# Patient Record
Sex: FEMALE | Race: WHITE | NOT HISPANIC OR LATINO | Employment: FULL TIME | URBAN - METROPOLITAN AREA
[De-identification: names, ages, dates, MRNs, and addresses within clinical notes are randomized per-mention and may not be internally consistent; named-entity substitution may affect disease eponyms.]

---

## 2020-10-19 ENCOUNTER — TRANSFERRED RECORDS (OUTPATIENT)
Dept: MULTI SPECIALTY CLINIC | Facility: CLINIC | Age: 40
End: 2020-10-19

## 2020-10-19 LAB — PAP SMEAR - HIM PATIENT REPORTED: NEGATIVE

## 2022-01-07 ENCOUNTER — NURSE TRIAGE (OUTPATIENT)
Dept: NURSING | Facility: CLINIC | Age: 42
End: 2022-01-07

## 2022-01-07 NOTE — TELEPHONE ENCOUNTER
Diagnosed with covid/ 12/31/21/ symptoms of nasal congestion and non productive cough as slightly better but still present/ nasal discharge contains some blood/ given homecare and symptoms to watch for/no fever and has never had one   aRmón Douglas RN [phone]    Reason for Disposition    [1] COVID-19 diagnosed by positive lab test AND [2] mild symptoms (e.g., cough, fever, others) AND [3] no complications or SOB    Additional Information    Negative: SEVERE difficulty breathing (e.g., struggling for each breath, speaks in single words)    Negative: Difficult to awaken or acting confused (e.g., disoriented, slurred speech)    Negative: Bluish (or gray) lips or face now    Negative: Shock suspected (e.g., cold/pale/clammy skin, too weak to stand, low BP, rapid pulse)    Negative: Sounds like a life-threatening emergency to the triager    Negative: [1] COVID-19 exposure AND [2] no symptoms    Negative: COVID-19 vaccine reaction suspected (e.g., fever, headache, muscle aches) occurring 1 to 3 days after getting vaccine    Negative: COVID-19 vaccine, questions about    Negative: [1] Lives with someone known to have influenza (flu test positive) AND [2] flu-like symptoms (e.g., cough, runny nose, sore throat, SOB; with or without fever)    Negative: [1] Adult with possible COVID-19 symptoms AND [2] triager concerned about severity of symptoms or other causes    Negative: COVID-19 and breastfeeding, questions about    Negative: SEVERE or constant chest pain or pressure (Exception: mild central chest pain, present only when coughing)    Negative: MODERATE difficulty breathing (e.g., speaks in phrases, SOB even at rest, pulse 100-120)    Negative: [1] Headache AND [2] stiff neck (can't touch chin to chest)    Negative: MILD difficulty breathing (e.g., minimal/no SOB at rest, SOB with walking, pulse <100)    Negative: Chest pain or pressure    Negative: Patient sounds very sick or weak to the triager    Negative:  Fever > 103 F (39.4 C)    Negative: [1] Fever > 101 F (38.3 C) AND [2] age > 60 years    Negative: [1] Fever > 100.0 F (37.8 C) AND [2] bedridden (e.g., nursing home patient, CVA, chronic illness, recovering from surgery)    Negative: HIGH RISK for severe COVID complications (e.g., age > 64 years, obesity with BMI > 25, pregnant, chronic lung disease or other chronic medical condition)  (Exception: Already seen by PCP and no new or worsening symptoms.)    Negative: [1] HIGH RISK patient AND [2] influenza is widespread in the community AND [3] ONE OR MORE respiratory symptoms: cough, sore throat, runny or stuffy nose    Negative: [1] HIGH RISK patient AND [2] influenza exposure within the last 7 days AND [3] ONE OR MORE respiratory symptoms: cough, sore throat, runny or stuffy nose    Negative: [1] COVID-19 infection suspected by caller or triager AND [2] mild symptoms (cough, fever, or others) AND [3] negative COVID-19 rapid test    Negative: Fever present > 3 days (72 hours)    Negative: [1] Fever returns after gone for over 24 hours AND [2] symptoms worse or not improved    Negative: [1] Continuous (nonstop) coughing interferes with work or school AND [2] no improvement using cough treatment per protocol    Negative: Cough present > 3 weeks    Negative: [1] COVID-19 diagnosed by positive lab test AND [2] NO symptoms (e.g., cough, fever, others)    Protocols used: CORONAVIRUS (COVID-19) DIAGNOSED OR [id number] 8.25.2021

## 2022-01-30 ENCOUNTER — HEALTH MAINTENANCE LETTER (OUTPATIENT)
Age: 42
End: 2022-01-30

## 2022-06-23 ENCOUNTER — OFFICE VISIT (OUTPATIENT)
Dept: FAMILY MEDICINE | Facility: CLINIC | Age: 42
End: 2022-06-23
Payer: COMMERCIAL

## 2022-06-23 VITALS
RESPIRATION RATE: 16 BRPM | DIASTOLIC BLOOD PRESSURE: 75 MMHG | OXYGEN SATURATION: 99 % | TEMPERATURE: 97.3 F | BODY MASS INDEX: 21.07 KG/M2 | WEIGHT: 118.9 LBS | HEIGHT: 63 IN | SYSTOLIC BLOOD PRESSURE: 107 MMHG | HEART RATE: 73 BPM

## 2022-06-23 DIAGNOSIS — F41.9 ANXIETY: ICD-10-CM

## 2022-06-23 DIAGNOSIS — M21.611 BUNION OF RIGHT FOOT: Primary | ICD-10-CM

## 2022-06-23 DIAGNOSIS — Z01.818 PREOP GENERAL PHYSICAL EXAM: ICD-10-CM

## 2022-06-23 LAB — HGB BLD-MCNC: 13.5 G/DL (ref 11.7–15.7)

## 2022-06-23 PROCEDURE — 99214 OFFICE O/P EST MOD 30 MIN: CPT | Performed by: PHYSICIAN ASSISTANT

## 2022-06-23 PROCEDURE — 85018 HEMOGLOBIN: CPT | Performed by: PHYSICIAN ASSISTANT

## 2022-06-23 PROCEDURE — 36415 COLL VENOUS BLD VENIPUNCTURE: CPT | Performed by: PHYSICIAN ASSISTANT

## 2022-06-23 RX ORDER — INDOMETHACIN 75 MG/1
75 CAPSULE, EXTENDED RELEASE ORAL 2 TIMES DAILY WITH MEALS
COMMUNITY
End: 2022-06-23

## 2022-06-23 RX ORDER — SERTRALINE HYDROCHLORIDE 25 MG/1
25 TABLET, FILM COATED ORAL DAILY
COMMUNITY
End: 2022-06-23

## 2022-06-23 RX ORDER — LACTOBACILLUS RHAMNOSUS GG 10B CELL
1 CAPSULE ORAL 2 TIMES DAILY
COMMUNITY
End: 2023-01-19

## 2022-06-23 ASSESSMENT — PAIN SCALES - GENERAL: PAINLEVEL: MILD PAIN (3)

## 2022-06-23 NOTE — PROGRESS NOTES
[address], [address]  [facility] [address]  Phone: [phone]  Primary Provider: No Ref-Primary, Physician  Pre-op Performing Provider: ELIEZER WHITLEY PA-C      PREOPERATIVE EVALUATION:  Today's date: 6/23/2022    Yvonne Mercado is a 41 year old female who presents for a preoperative evaluation.    Surgical Information:  Surgery/Procedure: right foot bunionectomy  Surgery Location: [facility]  Surgeon: Dr. Sanford  Surgery Date: 7/11/22  Time of Surgery: TBD  Where patient plans to recover: At home with family  Fax number for surgical facility: [phone]    Type of Anesthesia Anticipated: to be determined    Assessment & Plan     The proposed surgical procedure is considered LOW risk.    Bunion of right foot  Pt to stop nsaids one week prior to surgery    Preop general physical exam    - Hemoglobin    Anxiety  - stable  - sertraline (ZOLOFT) 50 MG tablet; Take 1 tablet (50 mg) by mouth daily refilled         Risks and Recommendations:  The patient has the following additional risks and recommendations for perioperative complications:   - No identified additional risk factors other than previously addressed    Medication Instructions:  Patient is to take all scheduled medications on the day of surgery    RECOMMENDATION:  APPROVAL GIVEN to proceed with proposed procedure, without further diagnostic evaluation.      Subjective     HPI related to upcoming procedure: bunion    Preop Questions 6/22/2022   1. Have you ever had a heart attack or stroke? No   2. Have you ever had surgery on your heart or blood vessels, such as a stent placement, a coronary artery bypass, or surgery on an artery in your head, neck, heart, or legs? No   3. Do you have chest pain with activity? No   4. Do you have a history of  heart failure? No   5. Do you currently have a cold, bronchitis or symptoms of other infection? No   6. Do you have a cough, shortness of breath, or wheezing? No   7. Do  you or anyone in your family have previous history of blood clots? No   8. Do you or does anyone in your family have a serious bleeding problem such as prolonged bleeding following surgeries or cuts? No   9. Have you ever had problems with anemia or been told to take iron pills? No   10. Have you had any abnormal blood loss such as black, tarry or bloody stools, or abnormal vaginal bleeding? No   11. Have you ever had a blood transfusion? No   12. Are you willing to have a blood transfusion if it is medically needed before, during, or after your surgery? Yes   13. Have you or any of your relatives ever had problems with anesthesia? No   14. Do you have sleep apnea, excessive snoring or daytime drowsiness? No   15. Do you have any artifical heart valves or other implanted medical devices like a pacemaker, defibrillator, or continuous glucose monitor? No   16. Do you have artificial joints? No   17. Are you allergic to latex? No   18. Is there any chance that you may be pregnant? No         Review of Systems  CONSTITUTIONAL: NEGATIVE for fever, chills, change in weight  ENT/MOUTH: NEGATIVE for ear, mouth and throat problems  RESP: NEGATIVE for significant cough or SOB  CV: NEGATIVE for chest pain, palpitations or peripheral edema    There are no problems to display for this patient.     Past Medical History:   Diagnosis Date     Anxiety      Bunion, right foot      Past Surgical History:   Procedure Laterality Date     DILATION AND CURETTAGE  2014     LASIK  2021     Current Outpatient Medications   Medication Sig Dispense Refill     lactobacillus rhamnosus, GG, (CULTURELL) capsule Take 1 capsule by mouth 2 times daily       sertraline (ZOLOFT) 50 MG tablet Take 1 tablet (50 mg) by mouth daily 90 tablet 1       No Known Allergies     Social History     Tobacco Use     Smoking status: Former Smoker     Smokeless tobacco: Never Used   Substance Use Topics     Alcohol use: Not on file     Family History   Problem  "Relation Age of Onset     Depression Father      Osteoporosis Maternal Grandmother      Depression Paternal Grandmother      Alcoholism Paternal Grandmother      History   Drug Use Not on file         Objective     /75 (BP Location: Left arm, Patient Position: Sitting, Cuff Size: Adult Regular)   Pulse 73   Temp 97.3  F (36.3  C) (Temporal)   Resp 16   Ht 1.588 m (5' 2.5\")   Wt 53.9 kg (118 lb 14.4 oz)   SpO2 99%   BMI 21.40 kg/m      Physical Exam  GENERAL APPEARANCE: healthy, alert and no distress  HENT: ear canals and TM's normal and nose and mouth without ulcers or lesions  RESP: lungs clear to auscultation - no rales, rhonchi or wheezes  CV: regular rate and rhythm, normal S1 S2, no S3 or S4 and no murmur, click or rub   ABDOMEN: soft, nontender, no HSM or masses and bowel sounds normal  NEURO: Normal strength and tone, sensory exam grossly normal, mentation intact and speech normal  MSK: R foot has bunion deformity. Pt using cane for ambulation due to pain from bunion    Diagnostics:     Results for orders placed or performed in visit on 06/23/22   Hemoglobin     Status: Normal   Result Value Ref Range    Hemoglobin 13.5 11.7 - 15.7 g/dL         Signed Electronically by: Indy Pederson PA-C  Copy of this evaluation report is provided to requesting physician.      "

## 2022-10-15 ENCOUNTER — HEALTH MAINTENANCE LETTER (OUTPATIENT)
Age: 42
End: 2022-10-15

## 2023-01-18 ENCOUNTER — E-VISIT (OUTPATIENT)
Dept: URGENT CARE | Facility: CLINIC | Age: 43
End: 2023-01-18
Payer: COMMERCIAL

## 2023-01-18 DIAGNOSIS — R21 RASH: Primary | ICD-10-CM

## 2023-01-18 PROCEDURE — 99207 PR NON-BILLABLE SERV PER CHARTING: CPT | Performed by: NURSE PRACTITIONER

## 2023-01-19 ENCOUNTER — OFFICE VISIT (OUTPATIENT)
Dept: FAMILY MEDICINE | Facility: CLINIC | Age: 43
End: 2023-01-19
Payer: COMMERCIAL

## 2023-01-19 VITALS
BODY MASS INDEX: 21.62 KG/M2 | WEIGHT: 122 LBS | SYSTOLIC BLOOD PRESSURE: 111 MMHG | RESPIRATION RATE: 18 BRPM | DIASTOLIC BLOOD PRESSURE: 66 MMHG | HEIGHT: 63 IN | OXYGEN SATURATION: 100 % | HEART RATE: 79 BPM | TEMPERATURE: 96.9 F

## 2023-01-19 DIAGNOSIS — L73.8 HOT TUB FOLLICULITIS: Primary | ICD-10-CM

## 2023-01-19 PROCEDURE — 99213 OFFICE O/P EST LOW 20 MIN: CPT | Performed by: PHYSICIAN ASSISTANT

## 2023-01-19 RX ORDER — CIPROFLOXACIN 500 MG/1
500 TABLET, FILM COATED ORAL 2 TIMES DAILY
Qty: 10 TABLET | Refills: 0 | Status: SHIPPED | OUTPATIENT
Start: 2023-01-19

## 2023-01-19 ASSESSMENT — PAIN SCALES - GENERAL: PAINLEVEL: NO PAIN (0)

## 2023-01-19 NOTE — PATIENT INSTRUCTIONS
Dear Yvonne Mercado,    We are sorry you are not feeling well. Based on the responses you provided, it is recommended that you be seen in-person in urgent care so we can better evaluate your symptoms. Please click here to find the nearest urgent care location to you.   You will not be charged for this Visit. Thank you for trusting us with your care.    Karley Quach, CNP

## 2023-01-19 NOTE — Clinical Note
Please abstract the following data from this visit with this patient into the appropriate field in Epic:  Tests that can be patient reported without a hard copy:  Pap smear done on this date: 10/19/2020 (approximately), by this group: [facility], results were Normal.

## 2023-01-19 NOTE — PROGRESS NOTES
"      Lisa Russell is a 42 year old presenting for the following health issues:  Derm Problem (Rash for 2 days) and Health Maintenance (Last Pap Smear done on 10/19/2020 at [facility] [place], abstracted)      Pt has rash for 2 days  She was at her cabin where there is a hot tub 2 days prior to onset of rash  Rash is pruritic  No sore throat or cold sxs  She did start an herbal supplement for menopause but not sure what is in this  Otherwise feels well.    History of Present Illness       Reason for visit:  An itchy rash on my torso  Symptom onset:  1-3 days ago  Symptoms include:  Torso  Symptom intensity:  Moderate  Symptom progression:  Staying the same  Had these symptoms before:  No  What makes it worse:  No  What makes it better:  No    She eats 2-3 servings of fruits and vegetables daily.She consumes 0 sweetened beverage(s) daily.She exercises with enough effort to increase her heart rate 30 to 60 minutes per day.  She exercises with enough effort to increase her heart rate 5 days per week.   She is taking medications regularly.       Past Medical History:   Diagnosis Date     Anxiety      Bunion, right foot      Past Surgical History:   Procedure Laterality Date     DILATION AND CURETTAGE  2014     LASIK  2021     Social History     Tobacco Use     Smoking status: Former     Smokeless tobacco: Never   Substance Use Topics     Alcohol use: Yes     Comment: 7 drinks a week     Current Outpatient Medications   Medication Sig Dispense Refill     ciprofloxacin (CIPRO) 500 MG tablet Take 1 tablet (500 mg) by mouth 2 times daily 10 tablet 0     sertraline (ZOLOFT) 50 MG tablet TAKE 1 TABLET(50 MG) BY MOUTH DAILY 90 tablet 0     Allergies   Allergen Reactions     No Known Allergies        PHYSICAL EXAM:    /66 (BP Location: Right arm, Patient Position: Sitting, Cuff Size: Adult Regular)   Pulse 79   Temp 96.9  F (36.1  C) (Temporal)   Resp 18   Ht 1.588 m (5' 2.5\")   Wt 55.3 kg (122 lb)   " SpO2 100%   BMI 21.96 kg/m      Patient appears non toxic  Diffuse papular rash on buttocks, abd and few other scattered papules on back    Assessment and Plan:     (L73.8) Hot tub folliculitis  (primary encounter diagnosis)  Comment: call the crew that manages the hot tub as suspect pseudomonoas infection  Plan: ciprofloxacin (CIPRO) 500 MG tablet        Bid x 5d, hydrocortisone cream bid for the itch and okay to take benadryl at night if itchy.      Indy Pederson PA-C

## 2023-03-26 ENCOUNTER — HEALTH MAINTENANCE LETTER (OUTPATIENT)
Age: 43
End: 2023-03-26

## 2023-07-06 DIAGNOSIS — F41.9 ANXIETY: ICD-10-CM

## 2023-08-07 ENCOUNTER — OFFICE VISIT (OUTPATIENT)
Dept: URGENT CARE | Facility: URGENT CARE | Age: 43
End: 2023-08-07
Payer: COMMERCIAL

## 2023-08-07 VITALS
HEART RATE: 85 BPM | RESPIRATION RATE: 15 BRPM | BODY MASS INDEX: 22.32 KG/M2 | SYSTOLIC BLOOD PRESSURE: 109 MMHG | TEMPERATURE: 100.1 F | OXYGEN SATURATION: 96 % | DIASTOLIC BLOOD PRESSURE: 72 MMHG | WEIGHT: 124 LBS
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
## 2023-08-07 PROCEDURE — 99213 OFFICE O/P EST LOW 20 MIN: CPT | Mod: 25 | Performed by: PHYSICIAN ASSISTANT

## 2023-08-07 PROCEDURE — 90715 TDAP VACCINE 7 YRS/> IM: CPT | Performed by: PHYSICIAN ASSISTANT

## 2023-08-07 PROCEDURE — 90471 IMMUNIZATION ADMIN: CPT | Performed by: PHYSICIAN ASSISTANT

## 2023-08-07 NOTE — PATIENT INSTRUCTIONS
Keep clean and dry  Keep splint in position  Change bandage every 24 hours  Follow up with hand specialist

## 2023-08-07 NOTE — PROGRESS NOTES
Assessment & Plan     Dog bite, initial encounter  - amoxicillin-clavulanate (AUGMENTIN) 875-125 MG tablet; Take 1 tablet by mouth 2 times daily for 10 days  - Orthopedic  Referral; Future  - TDAP 10-64Y (ADACEL,BOOSTRIX)    Bite was about 22 hours ago. I used steri strips to close due on infection risk and will start on Augmentin twice daily for 10 days. Keep splint in place, change bandage every 24 hours. Follow up with hand specialist for further evaluation. Tetanus updated today.    Return in about 5 days (around 8/12/2023) for consult with hand specialist.     Krista Lanier PA-C  [facility]    Subjective   Yvonne Mercado is a 42 year old who presents for the following health issues     Patient presents with:  Dog Bite    HPI    Laceration    Mechanism of injury: dog bite  History provided by: Patient  Time of injury was 1 day ago- about 22 hrs ago.    This is a non-work related injury.    Associated symptoms: Denies numbness, weakness, or loss of function    Last tetanus booster within 10 years: No    LACERATION EXAM:   Size of laceration: 3 centimeters  Characteristics of the laceration: clean, jagged, and swollen  Depth of laceration: deep  Tendon function intact: Yes  Sensation to light touch intact: Yes  Pulses/capillary refill intact: Yes  Foreign body: No    Picture included in patient's chart: yes    PROCEDURE NOTE:  Anesthesia: None  Wound was explored for any foreign bodies and evaluated for tendon, nerve, vessel or joint involvement.    Closure was simple  Laceration was closed with Steri-strips  Bandage was applied  Patient tolerated the procedure well        Review of Systems   ROS negative except as stated above.      Objective    /72   Pulse 85   Temp 100.1  F (37.8  C)   Resp 15   Wt 56.2 kg (124 lb)   SpO2 96%   BMI 22.32 kg/m    Physical Exam   GENERAL: healthy, alert and no distress  SKIN: laceration to left volar pinky as shown in  photo above    No results found for any visits on 08/07/23.

## 2023-08-08 ENCOUNTER — OFFICE VISIT (OUTPATIENT)
Dept: ORTHOPEDICS | Facility: CLINIC | Age: 43
End: 2023-08-08
Attending: PHYSICIAN ASSISTANT
Payer: COMMERCIAL

## 2023-08-08 ENCOUNTER — PRE VISIT (OUTPATIENT)
Dept: ORTHOPEDICS | Facility: CLINIC | Age: 43
End: 2023-08-08

## 2023-08-08 ENCOUNTER — ANCILLARY PROCEDURE (OUTPATIENT)
Dept: GENERAL RADIOLOGY | Facility: CLINIC | Age: 43
End: 2023-08-08
Attending: STUDENT IN AN ORGANIZED HEALTH CARE EDUCATION/TRAINING PROGRAM
Payer: COMMERCIAL

## 2023-08-08 DIAGNOSIS — M79.646 PAIN IN FINGER: ICD-10-CM
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
## 2023-08-08 DIAGNOSIS — M79.646 PAIN IN FINGER: Primary | ICD-10-CM

## 2023-08-08 PROCEDURE — 99203 OFFICE O/P NEW LOW 30 MIN: CPT | Performed by: STUDENT IN AN ORGANIZED HEALTH CARE EDUCATION/TRAINING PROGRAM

## 2023-08-08 PROCEDURE — 73140 X-RAY EXAM OF FINGER(S): CPT | Mod: LT | Performed by: RADIOLOGY

## 2023-08-08 ASSESSMENT — ENCOUNTER SYMPTOMS
SKIN CHANGES: 0
POOR WOUND HEALING: 0
NAIL CHANGES: 0

## 2023-08-08 NOTE — PATIENT INSTRUCTIONS
Twice a day, soak hand in warm water with a little bit of antibacterial soap. After twenty minutes of soaking, rinse off hand, pat dry, and apply dressing. Apply antibiotic gauze (Xeroform) first, followed by normal gauze and a wrap or band-aid over top.

## 2023-08-08 NOTE — PROGRESS NOTES
"Chief Complaint:   Chief Complaint   Patient presents with    Consult     Consult for dog bite laceration to left small finger DOI 8/7/23       Referring Physician: Krista Lanier    Date of Injury: 8/6/23  Mechanism of Injury: Dog Bite  Diagnosis: Laceration left small finger  Treatment: PO antibiotics    History of Present Illness: Yvonne Mercado is a 42 year old RHD female presenting for evaluation of dog bite to her left small finger. She was bit by a foster dog at her neighbors house 2 days ago and was seen at urgent care yesterday for evaluation. X-rays at that time were negative. She does not have any sensory changes and is able to move all of the joints in her finger however flexion ROM is limited due to pain/edema. Bleeding has been well controlled however she states that the wound has been \"oozing\". The patient was given a tetanus shot and started on Augmentin at the urgent care yesterday. The wound was then closed with steri strips and the patient was referred here for further evaluation/treatment. No fever/chills.    Clinical documentation by Krista Lanier PA-C on 8/7/2023 was reviewed.      Past Medical History:   Past Medical History:   Diagnosis Date    Anxiety     Bunion, right foot        Past Surgical History:   Past Surgical History:   Procedure Laterality Date    DILATION AND CURETTAGE  2014    LASIK  2021       Medications:   Current Outpatient Medications:     amoxicillin-clavulanate (AUGMENTIN) 875-125 MG tablet, Take 1 tablet by mouth 2 times daily for 10 days, Disp: 20 tablet, Rfl: 0    ciprofloxacin (CIPRO) 500 MG tablet, Take 1 tablet (500 mg) by mouth 2 times daily, Disp: 10 tablet, Rfl: 0    sertraline (ZOLOFT) 50 MG tablet, TAKE 1 TABLET(50 MG) BY MOUTH DAILY, Disp: 90 tablet, Rfl: 0    Allergy:   Allergies   Allergen Reactions    No Known Allergies        Social History:   History   Smoking Status    Former   Smokeless Tobacco    Never      Social History     Tobacco Use "    Smoking status: Former    Smokeless tobacco: Never   Vaping Use    Vaping Use: Never used   Substance Use Topics    Alcohol use: Yes     Comment: 7 drinks a week    Drug use: Never        Family History:   Family History   Problem Relation Age of Onset    Depression Father     Osteoporosis Maternal Grandmother     Depression Paternal Grandmother     Alcoholism Paternal Grandmother        Physical Examination:  There were no vitals filed for this visit.  There is no height or weight on file to calculate BMI.    Well appearing, well nourished  Alert and oriented x 3, cooperative with exam     Left small finger  2cm open laceration overlying the volar aspect of the proximal phalanx  TTP around the laceration with associated edema. No significant erythema or purulent discharge.  Range of Motion:   Digit:  Full flexion/extension of DIP. Minimally decreased flexion of PIP secondary to pain/edema with full extension . +FDS/FDP  Motor Exam: Intact TU/OK/x2-3  Sensory Exam: Sensation intact to the distal finger. 2 point discrimination 5 mm UDN and RDN small finger  Vascular Exam:  Normal color and warmth to the distal finger    Imaging/Studies:  XR (3 views) of the left small finger was obtained 8/8/2023.  I reviewed the images with the patient.  The imaging study shows no fracture/dislocation.  Well maintained joint space.    Assessment: Yvonne Mercado is a 42 year old female presenting s/p dog bite to the left small finger, in stable condition    Plan:   I had a discussion with the patient regarding my clinical findings, diagnosis, and treatment plan. Her wound does not currently look infected and she is neurovascularly intact with intact tendon function. Continue PO antibiotics and local wound care.  All questions answered.     Local wound care with daily soaks and Xeroform to wound  - Continue PO antibiotics    Next Visit:   Follow-up: 1 week(s).   Imaging: None..   Plan: wound check      VENUS VAKHSHORI,  MD  Answers submitted by the patient for this visit:  Symptoms you have experienced in the last 30 days (Submitted on 8/8/2023)  General Symptoms: No  Skin Symptoms: Yes  HENT Symptoms: No  EYE SYMPTOMS: No  HEART SYMPTOMS: No  LUNG SYMPTOMS: No  INTESTINAL SYMPTOMS: No  URINARY SYMPTOMS: No  GYNECOLOGIC SYMPTOMS: No  BREAST SYMPTOMS: No  SKELETAL SYMPTOMS: No  BLOOD SYMPTOMS: No  NERVOUS SYSTEM SYMPTOMS: No  MENTAL HEALTH SYMPTOMS: No   (Submitted on 8/8/2023)  Changes in hair: No  Changes in moles/birth marks: No  Itching: No  Rashes: No  Changes in nails: No  Acne: No  Hair in places you don't want it: No  Change in facial hair: No  Warts: No  Non-healing sores: No  Scarring: No  Flaking of skin: No  Color changes of hands/feet in cold : No  Sun sensitivity: No  Skin thickening: No

## 2023-08-08 NOTE — NURSING NOTE
Reason For Visit:   Chief Complaint   Patient presents with    Consult     Consult for dog bite laceration to left small finger DOI 8/7/23       Primary MD: No Ref-Primary, Physician  Ref. MD: Krista Lanier PA-C    Age: 42 year old    ?  No      There were no vitals taken for this visit.      Pain Assessment  Patient Currently in Pain: Yes  0-10 Pain Scale: 3  Primary Pain Location: Finger (Comment which one) (left small)  Pain Descriptors: Burning, Intermittent  Alleviating Factors: NSAIDS, Ice    Hand Dominance Evaluation  Hand Dominance: Right          QuickDASH Assessment      8/8/2023     9:24 AM   QuickDASH Main   1. Open a tight or new jar Moderate difficulty   2. Do heavy household chores (e.g., wash walls, floors) Moderate difficulty   3. Carry a shopping bag or briefcase Mild difficulty   4. Wash your back Moderate difficulty   5. Use a knife to cut food Mild difficulty   6. Recreational activities in which you take some force or impact through your arm, shoulder or hand (e.g., golf, hammering, tennis, etc.) Moderate difficulty   7. During the past week, to what extent has your arm, shoulder or hand problem interfered with your normal social activities with family, friends, neighbours or groups Slightly   8. During the past week, were you limited in your work or other regular daily activities as a result of your arm, shoulder or hand problem Slightly limited   9. Arm, shoulder or hand pain Moderate   10.Tingling (pins and needles) in your arm,shoulder or hand Mild   11. During the past week, how much difficulty have you had sleeping because of the pain in your arm, shoulder or hand Mild difficulty   Quickdash Ability Score 36.36          Current Outpatient Medications   Medication Sig Dispense Refill    amoxicillin-clavulanate (AUGMENTIN) 875-125 MG tablet Take 1 tablet by mouth 2 times daily for 10 days 20 tablet 0    ciprofloxacin (CIPRO) 500 MG tablet Take 1 tablet (500 mg) by mouth 2 times  daily 10 tablet 0    sertraline (ZOLOFT) 50 MG tablet TAKE 1 TABLET(50 MG) BY MOUTH DAILY 90 tablet 0       Allergies   Allergen Reactions    No Known Allergies        Lori Mcmahan, ATC

## 2023-08-08 NOTE — LETTER
"    8/8/2023         RE: Yvonne Mercado  [address] [facility] [address]        Dear Colleague,    Thank you for referring your patient, Yvonne Mercado, to the [facility] [place]. Please see a copy of my visit note below.    Chief Complaint:   Chief Complaint   Patient presents with    Consult     Consult for dog bite laceration to left small finger DOI 8/7/23       Referring Physician: Krista Lanier    Date of Injury: 8/6/23  Mechanism of Injury: Dog Bite  Diagnosis: Laceration left small finger  Treatment: PO antibiotics    History of Present Illness: Yvonne Mercado is a 42 year old RHD female presenting for evaluation of dog bite to her left small finger. She was bit by a foster dog at her neighbors house 2 days ago and was seen at urgent care yesterday for evaluation. X-rays at that time were negative. She does not have any sensory changes and is able to move all of the joints in her finger however flexion ROM is limited due to pain/edema. Bleeding has been well controlled however she states that the wound has been \"oozing\". The patient was given a tetanus shot and started on Augmentin at the urgent care yesterday. The wound was then closed with steri strips and the patient was referred here for further evaluation/treatment. No fever/chills.    Clinical documentation by Krista Lanier PA-C on 8/7/2023 was reviewed.      Past Medical History:   Past Medical History:   Diagnosis Date    Anxiety     Bunion, right foot        Past Surgical History:   Past Surgical History:   Procedure Laterality Date    DILATION AND CURETTAGE  2014    LASIK  2021       Medications:   Current Outpatient Medications:     amoxicillin-clavulanate (AUGMENTIN) 875-125 MG tablet, Take 1 tablet by mouth 2 times daily for 10 days, Disp: 20 tablet, Rfl: 0    ciprofloxacin (CIPRO) 500 MG tablet, Take 1 tablet (500 mg) by mouth 2 times daily, Disp: 10 tablet, Rfl: 0    sertraline " (ZOLOFT) 50 MG tablet, TAKE 1 TABLET(50 MG) BY MOUTH DAILY, Disp: 90 tablet, Rfl: 0    Allergy:   Allergies   Allergen Reactions    No Known Allergies        Social History:   History   Smoking Status    Former   Smokeless Tobacco    Never      Social History     Tobacco Use    Smoking status: Former    Smokeless tobacco: Never   Vaping Use    Vaping Use: Never used   Substance Use Topics    Alcohol use: Yes     Comment: 7 drinks a week    Drug use: Never        Family History:   Family History   Problem Relation Age of Onset    Depression Father     Osteoporosis Maternal Grandmother     Depression Paternal Grandmother     Alcoholism Paternal Grandmother        Physical Examination:  There were no vitals filed for this visit.  There is no height or weight on file to calculate BMI.    Well appearing, well nourished  Alert and oriented x 3, cooperative with exam     Left small finger  2cm open laceration overlying the volar aspect of the proximal phalanx  TTP around the laceration with associated edema. No significant erythema or purulent discharge.  Range of Motion:   Digit:  Full flexion/extension of DIP. Minimally decreased flexion of PIP secondary to pain/edema with full extension . +FDS/FDP  Motor Exam: Intact TU/OK/x2-3  Sensory Exam: Sensation intact to the distal finger. 2 point discrimination 5 mm UDN and RDN small finger  Vascular Exam:  Normal color and warmth to the distal finger    Imaging/Studies:  XR (3 views) of the left small finger was obtained 8/8/2023.  I reviewed the images with the patient.  The imaging study shows no fracture/dislocation.  Well maintained joint space.    Assessment: Yvonne Mercado is a 42 year old female presenting s/p dog bite to the left small finger, in stable condition    Plan:   I had a discussion with the patient regarding my clinical findings, diagnosis, and treatment plan. Her wound does not currently look infected and she is neurovascularly intact with intact  tendon function. Continue PO antibiotics and local wound care.  All questions answered.     Local wound care with daily soaks and Xeroform to wound  - Continue PO antibiotics    Next Visit:   Follow-up: 1 week(s).   Imaging: None..   Plan: wound check      IAIN MD JONEL  Answers submitted by the patient for this visit:  Symptoms you have experienced in the last 30 days (Submitted on 8/8/2023)  General Symptoms: No  Skin Symptoms: Yes  HENT Symptoms: No  EYE SYMPTOMS: No  HEART SYMPTOMS: No  LUNG SYMPTOMS: No  INTESTINAL SYMPTOMS: No  URINARY SYMPTOMS: No  GYNECOLOGIC SYMPTOMS: No  BREAST SYMPTOMS: No  SKELETAL SYMPTOMS: No  BLOOD SYMPTOMS: No  NERVOUS SYSTEM SYMPTOMS: No  MENTAL HEALTH SYMPTOMS: No   (Submitted on 8/8/2023)  Changes in hair: No  Changes in moles/birth marks: No  Itching: No  Rashes: No  Changes in nails: No  Acne: No  Hair in places you don't want it: No  Change in facial hair: No  Warts: No  Non-healing sores: No  Scarring: No  Flaking of skin: No  Color changes of hands/feet in cold : No  Sun sensitivity: No  Skin thickening: No

## 2023-08-08 NOTE — TELEPHONE ENCOUNTER
DIAGNOSIS: dog bite, deep laceration on volar pinky, sensation and tendon fxn intact / Krista Lanier PA-C in [facility] / p1    APPOINTMENT DATE: 8/8/23   NOTES STATUS DETAILS   OFFICE NOTE from referring provider Internal 8/7/23 OV Krista Lanier PA-C    MEDICATION LIST Internal

## 2023-08-15 ENCOUNTER — OFFICE VISIT (OUTPATIENT)
Dept: ORTHOPEDICS | Facility: CLINIC | Age: 43
End: 2023-08-15
Payer: COMMERCIAL
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
## 2023-08-15 PROCEDURE — 99213 OFFICE O/P EST LOW 20 MIN: CPT | Performed by: PHYSICIAN ASSISTANT

## 2023-08-15 NOTE — PROGRESS NOTES
"Chief Complaint:   Chief Complaint   Patient presents with    RECHECK     1 week follow up on left pinky laceration 8/7/23     Referring Physician: Krista Lanier    Date of Injury: 8/6/23  Mechanism of Injury: Dog Bite  Diagnosis: Laceration left small finger  Treatment: PO antibiotics    History of Present Illness: Yvonne Mercado is a 42 year old RHD female presenting for evaluation of dog bite to her left small finger. She was bit by a foster dog at her neighbors house 2 days ago and was seen at urgent care yesterday for evaluation. X-rays at that time were negative. She does not have any sensory changes and is able to move all of the joints in her finger however flexion ROM is limited due to pain/edema. Bleeding has been well controlled however she states that the wound has been \"oozing\". The patient was given a tetanus shot and started on Augmentin at the urgent care yesterday. The wound was then closed with steri strips and the patient was referred here for further evaluation/treatment. No fever/chills.    Clinical documentation by Krista Lanier PA-C on 8/7/2023 was reviewed.    8/15/2023: She is doing well. She has two days left of antibiotics. Denies signs of infection. She has been doing warm soapy water soaks and daily dressing changes.     Physical Examination:  Well appearing, well nourished  Alert and oriented x 3, cooperative with exam     Left small finger  2cm open laceration overlying the volar aspect of the proximal phalanx, well healed.   Very mild TTP around the laceration with associated edema. No significant erythema or purulent discharge.  Range of Motion:   Digit:  Full flexion/extension of DIP. Minimally decreased flexion of PIP secondary to pain/edema with full extension . Able to make a near composite fist.   +FDS/FDP  Motor Exam: Intact TU/OK/x2-3  Sensory Exam: Sensation intact to the distal small finger radial and ulnar sides  Vascular Exam:  Normal color and warmth to the " distal finger    Imaging/Studies:  XR (3 views) of the left small finger was obtained 8/8/2023.  I reviewed the images with the patient.  The imaging study shows no fracture/dislocation.  Well maintained joint space.    Assessment: Yvonne Mercado is a 42 year old female presenting s/p dog bite to the left small finger, in stable condition    Plan:   I had a discussion with the patient regarding my clinical findings, diagnosis, and treatment plan. Her wound does not currently look infected and she is neurovascularly intact with intact tendon function.  All questions answered.     May discontinue warm water soaks. No need to cover the wound.   - Finish course of PO antibiotics  -  Referral placed for hand therapy for ROM and scar massage. Reviewed exercises with the patient, she will schedule with therapy if desired.     Next Visit:   Follow-up: PRN.     LILIBETH CARPENTER PA-C  Orthopaedic Surgery

## 2023-08-15 NOTE — NURSING NOTE
Reason For Visit:   Chief Complaint   Patient presents with    RECHECK     1 week follow up on left pinky laceration 8/7/23       Primary MD: No Ref-Primary, Physician  Ref. MD: est    Age: 42 year old    ?  No      There were no vitals taken for this visit.      Pain Assessment  Patient Currently in Pain: No (still tingles)    Hand Dominance Evaluation  Hand Dominance: Right          QuickDASH Assessment      8/14/2023     3:46 PM   QuickDASH Main   1. Open a tight or new jar Mild difficulty   2. Do heavy household chores (e.g., wash walls, floors) Mild difficulty   3. Carry a shopping bag or briefcase Mild difficulty   4. Wash your back No difficulty   5. Use a knife to cut food No difficulty   6. Recreational activities in which you take some force or impact through your arm, shoulder or hand (e.g., golf, hammering, tennis, etc.) Mild difficulty   7. During the past week, to what extent has your arm, shoulder or hand problem interfered with your normal social activities with family, friends, neighbours or groups Slightly   8. During the past week, were you limited in your work or other regular daily activities as a result of your arm, shoulder or hand problem Slightly limited   9. Arm, shoulder or hand pain Moderate   10.Tingling (pins and needles) in your arm,shoulder or hand Moderate   11. During the past week, how much difficulty have you had sleeping because of the pain in your arm, shoulder or hand Mild difficulty   Quickdash Ability Score 25          Current Outpatient Medications   Medication Sig Dispense Refill    amoxicillin-clavulanate (AUGMENTIN) 875-125 MG tablet Take 1 tablet by mouth 2 times daily for 10 days 20 tablet 0    ciprofloxacin (CIPRO) 500 MG tablet Take 1 tablet (500 mg) by mouth 2 times daily 10 tablet 0    sertraline (ZOLOFT) 50 MG tablet TAKE 1 TABLET(50 MG) BY MOUTH DAILY 90 tablet 0       Allergies   Allergen Reactions    No Known Allergies        Lori Mcmahan, ATC

## 2023-10-03 DIAGNOSIS — F41.9 ANXIETY: ICD-10-CM

## 2023-10-03 NOTE — TELEPHONE ENCOUNTER
Upon chart review, patient is a previous [facility] patient. Patient has not established care with a new primary care provider.     Will route refill request to [facility] for review. Please route to team to call patient and get her scheduled for an establish care visit with a new PCP.     Thank you,  Blanca Schultz RN

## 2024-01-07 ENCOUNTER — HEALTH MAINTENANCE LETTER (OUTPATIENT)
Age: 44
End: 2024-01-07

## 2024-04-29 ENCOUNTER — ANCILLARY PROCEDURE (OUTPATIENT)
Dept: MAMMOGRAPHY | Facility: CLINIC | Age: 44
End: 2024-04-29
Payer: COMMERCIAL

## 2024-04-29 DIAGNOSIS — Z12.31 VISIT FOR SCREENING MAMMOGRAM: ICD-10-CM

## 2024-04-29 PROCEDURE — 77067 SCR MAMMO BI INCL CAD: CPT | Mod: TC | Performed by: RADIOLOGY

## 2024-04-29 PROCEDURE — 77063 BREAST TOMOSYNTHESIS BI: CPT | Mod: TC | Performed by: RADIOLOGY

## 2024-05-26 ENCOUNTER — HEALTH MAINTENANCE LETTER (OUTPATIENT)
Age: 44
End: 2024-05-26

## 2025-06-14 ENCOUNTER — HEALTH MAINTENANCE LETTER (OUTPATIENT)
Age: 45
End: 2025-06-14